# Patient Record
Sex: FEMALE | Race: BLACK OR AFRICAN AMERICAN | NOT HISPANIC OR LATINO | Employment: FULL TIME | URBAN - METROPOLITAN AREA
[De-identification: names, ages, dates, MRNs, and addresses within clinical notes are randomized per-mention and may not be internally consistent; named-entity substitution may affect disease eponyms.]

---

## 2019-07-19 ENCOUNTER — OFFICE VISIT (OUTPATIENT)
Dept: INTERNAL MEDICINE | Facility: CLINIC | Age: 29
End: 2019-07-19
Payer: COMMERCIAL

## 2019-07-19 VITALS
HEIGHT: 61 IN | WEIGHT: 150 LBS | SYSTOLIC BLOOD PRESSURE: 114 MMHG | DIASTOLIC BLOOD PRESSURE: 70 MMHG | BODY MASS INDEX: 28.32 KG/M2

## 2019-07-19 DIAGNOSIS — Z00.00 HEALTH CARE MAINTENANCE: ICD-10-CM

## 2019-07-19 DIAGNOSIS — B37.9 ANTIBIOTIC-INDUCED YEAST INFECTION: ICD-10-CM

## 2019-07-19 DIAGNOSIS — N30.00 ACUTE CYSTITIS WITHOUT HEMATURIA: Primary | ICD-10-CM

## 2019-07-19 DIAGNOSIS — T36.95XA ANTIBIOTIC-INDUCED YEAST INFECTION: ICD-10-CM

## 2019-07-19 LAB
B-HCG UR QL: NEGATIVE
BACTERIA #/AREA URNS AUTO: ABNORMAL /HPF
BILIRUB UR QL STRIP: NEGATIVE
CLARITY UR REFRACT.AUTO: ABNORMAL
COLOR UR AUTO: ABNORMAL
CTP QC/QA: YES
GLUCOSE UR QL STRIP: NEGATIVE
HGB UR QL STRIP: ABNORMAL
KETONES UR QL STRIP: NEGATIVE
LEUKOCYTE ESTERASE UR QL STRIP: ABNORMAL
MICROSCOPIC COMMENT: ABNORMAL
NITRITE UR QL STRIP: NEGATIVE
PH UR STRIP: 6 [PH] (ref 5–8)
PROT UR QL STRIP: NEGATIVE
RBC #/AREA URNS AUTO: 2 /HPF (ref 0–4)
SP GR UR STRIP: 1 (ref 1–1.03)
SQUAMOUS #/AREA URNS AUTO: 2 /HPF
URN SPEC COLLECT METH UR: ABNORMAL
WBC #/AREA URNS AUTO: 14 /HPF (ref 0–5)

## 2019-07-19 PROCEDURE — 99204 OFFICE O/P NEW MOD 45 MIN: CPT | Mod: 25,S$GLB,, | Performed by: INTERNAL MEDICINE

## 2019-07-19 PROCEDURE — 99999 PR PBB SHADOW E&M-NEW PATIENT-LVL III: ICD-10-PCS | Mod: PBBFAC,,, | Performed by: INTERNAL MEDICINE

## 2019-07-19 PROCEDURE — 99999 PR PBB SHADOW E&M-NEW PATIENT-LVL III: CPT | Mod: PBBFAC,,, | Performed by: INTERNAL MEDICINE

## 2019-07-19 PROCEDURE — 99204 PR OFFICE/OUTPT VISIT, NEW, LEVL IV, 45-59 MIN: ICD-10-PCS | Mod: 25,S$GLB,, | Performed by: INTERNAL MEDICINE

## 2019-07-19 PROCEDURE — 81001 URINALYSIS AUTO W/SCOPE: CPT

## 2019-07-19 PROCEDURE — 81025 POCT URINE PREGNANCY: ICD-10-PCS | Mod: S$GLB,,, | Performed by: INTERNAL MEDICINE

## 2019-07-19 PROCEDURE — 3008F PR BODY MASS INDEX (BMI) DOCUMENTED: ICD-10-PCS | Mod: CPTII,S$GLB,, | Performed by: INTERNAL MEDICINE

## 2019-07-19 PROCEDURE — 81025 URINE PREGNANCY TEST: CPT | Mod: S$GLB,,, | Performed by: INTERNAL MEDICINE

## 2019-07-19 PROCEDURE — 87086 URINE CULTURE/COLONY COUNT: CPT

## 2019-07-19 PROCEDURE — 3008F BODY MASS INDEX DOCD: CPT | Mod: CPTII,S$GLB,, | Performed by: INTERNAL MEDICINE

## 2019-07-19 RX ORDER — NITROFURANTOIN 25; 75 MG/1; MG/1
100 CAPSULE ORAL 2 TIMES DAILY
Qty: 10 CAPSULE | Refills: 0 | Status: SHIPPED | OUTPATIENT
Start: 2019-07-19 | End: 2019-07-24

## 2019-07-19 RX ORDER — FLUCONAZOLE 150 MG/1
150 TABLET ORAL DAILY
Qty: 1 TABLET | Refills: 0 | Status: SHIPPED | OUTPATIENT
Start: 2019-07-19 | End: 2019-07-20

## 2019-07-19 NOTE — PROGRESS NOTES
"Subjective:       Patient ID: German Hancock is a 29 y.o. female.    Chief Complaint: Urinary Tract Infection (started on Mon , sx like nausea/lower back pain, pelvic pain, urgency, urinary frequency, burning sensations while urinating. )    HPI   30 yo F here for urgent bettye of low back pain, nausea, painful urination, frequency.   No fever.  No chronic medical issues and no chronic complaints.     Review of Systems   Constitutional: Negative for fever.   Respiratory: Negative for shortness of breath.    Cardiovascular: Negative for chest pain.   Musculoskeletal: Negative.    Skin: Negative.        Objective:   /70 (BP Location: Right arm, Patient Position: Sitting, BP Method: Medium (Manual))   Ht 5' 1" (1.549 m)   Wt 68 kg (150 lb)   BMI 28.34 kg/m²      Physical Exam   Constitutional: She is oriented to person, place, and time. She appears well-developed and well-nourished. No distress.   HENT:   Head: Normocephalic and atraumatic.   Cardiovascular: Normal rate and regular rhythm.   Pulmonary/Chest: Effort normal. No respiratory distress. She has no wheezes. She has no rales.   Neurological: She is alert and oriented to person, place, and time.   Skin: Skin is warm and dry. She is not diaphoretic.   Psychiatric: She has a normal mood and affect. Her behavior is normal.       Assessment:       1. Acute cystitis without hematuria    2. Health care maintenance    3. Antibiotic-induced yeast infection        Plan:       German was seen today for urinary tract infection.    Diagnoses and all orders for this visit:    Acute cystitis without hematuria  -     POCT Urinalysis  -     Urinalysis  -     Urine culture  -     POCT Urine Pregnancy - negative  -     nitrofurantoin, macrocrystal-monohydrate, (MACROBID) 100 MG capsule; Take 1 capsule (100 mg total) by mouth 2 (two) times daily. for 5 days    Health care maintenance  -     CBC auto differential; Future  -     Hemoglobin A1c; Future  -     Comprehensive metabolic " panel; Future  -     Lipid panel; Future  -     TSH; Future  -     Vitamin D; Future    Antibiotic-induced yeast infection  -     fluconazole (DIFLUCAN) 150 MG Tab; Take 1 tablet (150 mg total) by mouth once daily. for 1 day

## 2019-07-21 LAB — BACTERIA UR CULT: NORMAL

## 2019-07-25 ENCOUNTER — LAB VISIT (OUTPATIENT)
Dept: LAB | Facility: HOSPITAL | Age: 29
End: 2019-07-25
Attending: INTERNAL MEDICINE
Payer: COMMERCIAL

## 2019-07-25 DIAGNOSIS — Z00.00 HEALTH CARE MAINTENANCE: ICD-10-CM

## 2019-07-25 LAB
25(OH)D3+25(OH)D2 SERPL-MCNC: 19 NG/ML (ref 30–96)
ALBUMIN SERPL BCP-MCNC: 3.8 G/DL (ref 3.5–5.2)
ALP SERPL-CCNC: 80 U/L (ref 55–135)
ALT SERPL W/O P-5'-P-CCNC: 11 U/L (ref 10–44)
ANION GAP SERPL CALC-SCNC: 10 MMOL/L (ref 8–16)
AST SERPL-CCNC: 11 U/L (ref 10–40)
BASOPHILS # BLD AUTO: 0.03 K/UL (ref 0–0.2)
BASOPHILS NFR BLD: 0.6 % (ref 0–1.9)
BILIRUB SERPL-MCNC: 0.3 MG/DL (ref 0.1–1)
BUN SERPL-MCNC: 11 MG/DL (ref 6–20)
CALCIUM SERPL-MCNC: 8.8 MG/DL (ref 8.7–10.5)
CHLORIDE SERPL-SCNC: 108 MMOL/L (ref 95–110)
CHOLEST SERPL-MCNC: 129 MG/DL (ref 120–199)
CHOLEST/HDLC SERPL: 2.5 {RATIO} (ref 2–5)
CO2 SERPL-SCNC: 20 MMOL/L (ref 23–29)
CREAT SERPL-MCNC: 0.8 MG/DL (ref 0.5–1.4)
DIFFERENTIAL METHOD: ABNORMAL
EOSINOPHIL # BLD AUTO: 0.1 K/UL (ref 0–0.5)
EOSINOPHIL NFR BLD: 2.6 % (ref 0–8)
ERYTHROCYTE [DISTWIDTH] IN BLOOD BY AUTOMATED COUNT: 14 % (ref 11.5–14.5)
EST. GFR  (AFRICAN AMERICAN): >60 ML/MIN/1.73 M^2
EST. GFR  (NON AFRICAN AMERICAN): >60 ML/MIN/1.73 M^2
ESTIMATED AVG GLUCOSE: 111 MG/DL (ref 68–131)
GLUCOSE SERPL-MCNC: 95 MG/DL (ref 70–110)
HBA1C MFR BLD HPLC: 5.5 % (ref 4–5.6)
HCT VFR BLD AUTO: 36.9 % (ref 37–48.5)
HDLC SERPL-MCNC: 52 MG/DL (ref 40–75)
HDLC SERPL: 40.3 % (ref 20–50)
HGB BLD-MCNC: 12.1 G/DL (ref 12–16)
LDLC SERPL CALC-MCNC: 69.8 MG/DL (ref 63–159)
LYMPHOCYTES # BLD AUTO: 2 K/UL (ref 1–4.8)
LYMPHOCYTES NFR BLD: 44 % (ref 18–48)
MCH RBC QN AUTO: 30.5 PG (ref 27–31)
MCHC RBC AUTO-ENTMCNC: 32.8 G/DL (ref 32–36)
MCV RBC AUTO: 93 FL (ref 82–98)
MONOCYTES # BLD AUTO: 0.5 K/UL (ref 0.3–1)
MONOCYTES NFR BLD: 10.8 % (ref 4–15)
NEUTROPHILS # BLD AUTO: 1.9 K/UL (ref 1.8–7.7)
NEUTROPHILS NFR BLD: 42 % (ref 38–73)
NONHDLC SERPL-MCNC: 77 MG/DL
PLATELET # BLD AUTO: 234 K/UL (ref 150–350)
PMV BLD AUTO: 10.4 FL (ref 9.2–12.9)
POTASSIUM SERPL-SCNC: 4.1 MMOL/L (ref 3.5–5.1)
PROT SERPL-MCNC: 7.7 G/DL (ref 6–8.4)
RBC # BLD AUTO: 3.97 M/UL (ref 4–5.4)
SODIUM SERPL-SCNC: 138 MMOL/L (ref 136–145)
TRIGL SERPL-MCNC: 36 MG/DL (ref 30–150)
TSH SERPL DL<=0.005 MIU/L-ACNC: 1.05 UIU/ML (ref 0.4–4)
WBC # BLD AUTO: 4.64 K/UL (ref 3.9–12.7)

## 2019-07-25 PROCEDURE — 83036 HEMOGLOBIN GLYCOSYLATED A1C: CPT

## 2019-07-25 PROCEDURE — 80053 COMPREHEN METABOLIC PANEL: CPT

## 2019-07-25 PROCEDURE — 82306 VITAMIN D 25 HYDROXY: CPT

## 2019-07-25 PROCEDURE — 36415 COLL VENOUS BLD VENIPUNCTURE: CPT

## 2019-07-25 PROCEDURE — 84443 ASSAY THYROID STIM HORMONE: CPT

## 2019-07-25 PROCEDURE — 80061 LIPID PANEL: CPT

## 2019-07-25 PROCEDURE — 85025 COMPLETE CBC W/AUTO DIFF WBC: CPT

## 2019-10-17 ENCOUNTER — TELEPHONE (OUTPATIENT)
Dept: INTERNAL MEDICINE | Facility: CLINIC | Age: 29
End: 2019-10-17

## 2019-10-17 ENCOUNTER — NURSE TRIAGE (OUTPATIENT)
Dept: ADMINISTRATIVE | Facility: CLINIC | Age: 29
End: 2019-10-17

## 2019-10-17 NOTE — TELEPHONE ENCOUNTER
Ok to schedule next week in daily change slot. If feels she needs to be seen sooner needs to see another provider.

## 2019-10-17 NOTE — TELEPHONE ENCOUNTER
Reason for Disposition   Nursing judgment    Protocols used: ST NO PROTOCOL CALL: SICK ADULT-A-[place]    German called today for appt with Fatuma Buenrostro MD, pcp.  Her call was transferred to triage line due to her report of SOB with any exertion, and palpitations.  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  She said she wants only appt in clinic, and only with Dr Buenrostro.  Of note, she states she has a strong family history of heart disease, both mother and brother with same.  Explained there is not an appt with Dr Buenrostro available today, and offered her immediate appt in clinic with NP in clinic, or Dr New in one hour.  She declined.  Assured her will message Dr Buenrostro with her request to be called with her soonest available appt to be seen, by her only.  Please contact German directly with any additional care advice. She is [employer] employee and work number she gave me is [phone] and it is ok to call her back at this number..

## 2019-10-17 NOTE — TELEPHONE ENCOUNTER
Spoke to patient. Patient is scheduled for same day appointment with PCP on Tues 10/22 at 1130 am.

## 2019-10-22 ENCOUNTER — OFFICE VISIT (OUTPATIENT)
Dept: INTERNAL MEDICINE | Facility: CLINIC | Age: 29
End: 2019-10-22
Payer: COMMERCIAL

## 2019-10-22 VITALS
SYSTOLIC BLOOD PRESSURE: 118 MMHG | WEIGHT: 141 LBS | DIASTOLIC BLOOD PRESSURE: 72 MMHG | BODY MASS INDEX: 26.62 KG/M2 | HEIGHT: 61 IN

## 2019-10-22 DIAGNOSIS — Z00.00 HEALTH CARE MAINTENANCE: ICD-10-CM

## 2019-10-22 DIAGNOSIS — F41.9 ANXIETY AND DEPRESSION: ICD-10-CM

## 2019-10-22 DIAGNOSIS — R00.2 PALPITATIONS: Primary | ICD-10-CM

## 2019-10-22 DIAGNOSIS — F41.0 PANIC ATTACKS: ICD-10-CM

## 2019-10-22 DIAGNOSIS — G47.00 INSOMNIA, UNSPECIFIED TYPE: ICD-10-CM

## 2019-10-22 DIAGNOSIS — E55.9 VITAMIN D INSUFFICIENCY: ICD-10-CM

## 2019-10-22 DIAGNOSIS — F32.A ANXIETY AND DEPRESSION: ICD-10-CM

## 2019-10-22 PROCEDURE — 99999 PR PBB SHADOW E&M-EST. PATIENT-LVL III: CPT | Mod: PBBFAC,,, | Performed by: INTERNAL MEDICINE

## 2019-10-22 PROCEDURE — 3008F PR BODY MASS INDEX (BMI) DOCUMENTED: ICD-10-PCS | Mod: CPTII,S$GLB,, | Performed by: INTERNAL MEDICINE

## 2019-10-22 PROCEDURE — 93010 EKG 12-LEAD: ICD-10-PCS | Mod: S$GLB,,, | Performed by: INTERNAL MEDICINE

## 2019-10-22 PROCEDURE — 99214 OFFICE O/P EST MOD 30 MIN: CPT | Mod: S$GLB,,, | Performed by: INTERNAL MEDICINE

## 2019-10-22 PROCEDURE — 3008F BODY MASS INDEX DOCD: CPT | Mod: CPTII,S$GLB,, | Performed by: INTERNAL MEDICINE

## 2019-10-22 PROCEDURE — 93005 EKG 12-LEAD: ICD-10-PCS | Mod: S$GLB,,, | Performed by: INTERNAL MEDICINE

## 2019-10-22 PROCEDURE — 93005 ELECTROCARDIOGRAM TRACING: CPT | Mod: S$GLB,,, | Performed by: INTERNAL MEDICINE

## 2019-10-22 PROCEDURE — 99999 PR PBB SHADOW E&M-EST. PATIENT-LVL III: ICD-10-PCS | Mod: PBBFAC,,, | Performed by: INTERNAL MEDICINE

## 2019-10-22 PROCEDURE — 93010 ELECTROCARDIOGRAM REPORT: CPT | Mod: S$GLB,,, | Performed by: INTERNAL MEDICINE

## 2019-10-22 PROCEDURE — 99214 PR OFFICE/OUTPT VISIT, EST, LEVL IV, 30-39 MIN: ICD-10-PCS | Mod: S$GLB,,, | Performed by: INTERNAL MEDICINE

## 2019-10-22 RX ORDER — TRAZODONE HYDROCHLORIDE 50 MG/1
50 TABLET ORAL NIGHTLY PRN
Qty: 30 TABLET | Refills: 3 | Status: SHIPPED | OUTPATIENT
Start: 2019-10-22 | End: 2019-12-04

## 2019-10-22 RX ORDER — VIT C/E/ZN/COPPR/LUTEIN/ZEAXAN 250MG-90MG
1000 CAPSULE ORAL DAILY
Refills: 0 | COMMUNITY
Start: 2019-10-22

## 2019-10-22 RX ORDER — ESCITALOPRAM OXALATE 10 MG/1
10 TABLET ORAL DAILY
Qty: 90 TABLET | Refills: 3 | Status: SHIPPED | OUTPATIENT
Start: 2019-10-22 | End: 2020-03-13 | Stop reason: SDUPTHER

## 2019-10-22 RX ORDER — PROPRANOLOL HYDROCHLORIDE 10 MG/1
10 TABLET ORAL 3 TIMES DAILY PRN
Qty: 30 TABLET | Refills: 1 | Status: SHIPPED | OUTPATIENT
Start: 2019-10-22 | End: 2019-12-04

## 2019-10-22 NOTE — PROGRESS NOTES
"Subjective:       Patient ID: German Hancock is a 29 y.o. female.    Chief Complaint: Palpitations (reports of recurrent heart palpitations along with SOB (heaviness in chest). persistent for a few weeks, progressively nagging.)    HPI   30 yo F here for urgent eval of palpitations and shortness of breath present for few weeks.   No chronic medical issues nor medications.   Labs in July nl except low vit d.     Previously diagnosed anxiety/depression. Was on lexapro prior to son born in 2018.   Review of Systems   Constitutional: Positive for activity change and unexpected weight change.   HENT: Positive for rhinorrhea. Negative for hearing loss and trouble swallowing.    Eyes: Negative for discharge and visual disturbance.   Respiratory: Negative for chest tightness and wheezing.    Cardiovascular: Positive for chest pain and palpitations.   Gastrointestinal: Negative for blood in stool, constipation, diarrhea and vomiting.   Endocrine: Negative for polydipsia and polyuria.   Genitourinary: Negative for difficulty urinating, dysuria, hematuria and menstrual problem.   Musculoskeletal: Positive for neck pain. Negative for arthralgias and joint swelling.   Neurological: Positive for headaches. Negative for weakness.   Psychiatric/Behavioral: Positive for dysphoric mood. Negative for confusion.       Objective:   /72 (BP Location: Right arm, Patient Position: Sitting, BP Method: Medium (Manual))   Ht 5' 1" (1.549 m)   Wt 64 kg (141 lb)   BMI 26.64 kg/m²      Physical Exam   Constitutional: She is oriented to person, place, and time. She appears well-developed and well-nourished. No distress.   HENT:   Head: Normocephalic and atraumatic.   Cardiovascular: Normal rate and regular rhythm.   Pulmonary/Chest: Effort normal. No respiratory distress. She has no wheezes. She has no rales.   Neurological: She is alert and oriented to person, place, and time.   Skin: Skin is warm and dry. She is not diaphoretic. "   Psychiatric: She has a normal mood and affect. Her behavior is normal.       Assessment:       1. Palpitations    2. Vitamin D insufficiency    3. Anxiety and depression    4. Insomnia, unspecified type    5. Health care maintenance    6. Panic attacks        Plan:       German was seen today for palpitations.    Diagnoses and all orders for this visit:    Palpitations due to Anxiety and depression  -     Start escitalopram oxalate (LEXAPRO) 10 MG tablet; Take 1 tablet (10 mg total) by mouth once daily.  -     EKG 12-lead  -     propranolol (INDERAL) 10 MG tablet; Take 1 tablet (10 mg total) by mouth 3 (three) times daily as needed (anxiety).    Vitamin D insufficiency  -     cholecalciferol, vitamin D3, (VITAMIN D3) 1,000 unit capsule; Take 1 capsule (1,000 Units total) by mouth once daily.    Insomnia, unspecified type  -     traZODone (DESYREL) 50 MG tablet; Take 1 tablet (50 mg total) by mouth nightly as needed for Insomnia.    Health care maintenance  -     Ambulatory referral to Obstetrics / Gynecology    Panic attacks  -     propranolol (INDERAL) 10 MG tablet; Take 1 tablet (10 mg total) by mouth 3 (three) times daily as needed (anxiety).

## 2019-10-23 ENCOUNTER — TELEPHONE (OUTPATIENT)
Dept: INTERNAL MEDICINE | Facility: CLINIC | Age: 29
End: 2019-10-23

## 2019-10-23 NOTE — TELEPHONE ENCOUNTER
I have looked too and I don't see anything open in 6 weeks for a Tuesday afternoon. I'm also not able to override the schedule. Let me see if someone else can override.    Tessie.

## 2019-10-23 NOTE — TELEPHONE ENCOUNTER
----- Message from Kaitlynn Mcallister sent at 10/23/2019 10:58 AM CDT -----  Dr cueva wants to see pt back in 6 week nothing coming up to me.  Would like a Tuesday afternoon

## 2019-11-20 ENCOUNTER — PATIENT OUTREACH (OUTPATIENT)
Dept: ADMINISTRATIVE | Facility: HOSPITAL | Age: 29
End: 2019-11-20

## 2019-12-04 ENCOUNTER — OFFICE VISIT (OUTPATIENT)
Dept: INTERNAL MEDICINE | Facility: CLINIC | Age: 29
End: 2019-12-04
Payer: COMMERCIAL

## 2019-12-04 VITALS
HEIGHT: 61 IN | BODY MASS INDEX: 26.62 KG/M2 | WEIGHT: 141 LBS | SYSTOLIC BLOOD PRESSURE: 116 MMHG | DIASTOLIC BLOOD PRESSURE: 74 MMHG

## 2019-12-04 DIAGNOSIS — F41.9 ANXIETY AND DEPRESSION: Primary | ICD-10-CM

## 2019-12-04 DIAGNOSIS — N30.90 CYSTITIS: ICD-10-CM

## 2019-12-04 DIAGNOSIS — R39.15 URINARY URGENCY: ICD-10-CM

## 2019-12-04 DIAGNOSIS — F32.A ANXIETY AND DEPRESSION: Primary | ICD-10-CM

## 2019-12-04 DIAGNOSIS — G47.00 INSOMNIA, UNSPECIFIED TYPE: ICD-10-CM

## 2019-12-04 LAB
B-HCG UR QL: NEGATIVE
BACTERIA #/AREA URNS AUTO: ABNORMAL /HPF
BILIRUB SERPL-MCNC: NEGATIVE MG/DL
BILIRUB UR QL STRIP: NEGATIVE
BLOOD, POC UA: ABNORMAL
CLARITY UR REFRACT.AUTO: ABNORMAL
COLOR UR AUTO: YELLOW
CTP QC/QA: YES
GLUCOSE UR QL STRIP: NEGATIVE
GLUCOSE UR QL STRIP: NORMAL
HGB UR QL STRIP: ABNORMAL
KETONES UR QL STRIP: NEGATIVE
KETONES UR QL STRIP: NEGATIVE
LEUKOCYTE ESTERASE UR QL STRIP: ABNORMAL
LEUKOCYTE ESTERASE URINE, POC: ABNORMAL
MICROSCOPIC COMMENT: ABNORMAL
NITRITE UR QL STRIP: POSITIVE
NITRITE, POC UA: POSITIVE
PH UR STRIP: 5 [PH] (ref 5–8)
PH, POC UA: 5
PROT UR QL STRIP: NEGATIVE
PROTEIN, POC: ABNORMAL
RBC #/AREA URNS AUTO: 13 /HPF (ref 0–4)
SP GR UR STRIP: 1.03 (ref 1–1.03)
SPECIFIC GRAVITY, POC UA: 1.02
URN SPEC COLLECT METH UR: ABNORMAL
UROBILINOGEN, POC UA: NORMAL
WBC #/AREA URNS AUTO: 46 /HPF (ref 0–5)

## 2019-12-04 PROCEDURE — 99999 PR PBB SHADOW E&M-EST. PATIENT-LVL III: CPT | Mod: PBBFAC,,, | Performed by: INTERNAL MEDICINE

## 2019-12-04 PROCEDURE — 3008F BODY MASS INDEX DOCD: CPT | Mod: CPTII,S$GLB,, | Performed by: INTERNAL MEDICINE

## 2019-12-04 PROCEDURE — 81001 URINALYSIS AUTO W/SCOPE: CPT

## 2019-12-04 PROCEDURE — 81025 URINE PREGNANCY TEST: CPT | Mod: S$GLB,,, | Performed by: INTERNAL MEDICINE

## 2019-12-04 PROCEDURE — 87088 URINE BACTERIA CULTURE: CPT

## 2019-12-04 PROCEDURE — 87077 CULTURE AEROBIC IDENTIFY: CPT

## 2019-12-04 PROCEDURE — 99214 PR OFFICE/OUTPT VISIT, EST, LEVL IV, 30-39 MIN: ICD-10-PCS | Mod: 25,S$GLB,, | Performed by: INTERNAL MEDICINE

## 2019-12-04 PROCEDURE — 99214 OFFICE O/P EST MOD 30 MIN: CPT | Mod: 25,S$GLB,, | Performed by: INTERNAL MEDICINE

## 2019-12-04 PROCEDURE — 81025 POCT URINE PREGNANCY: ICD-10-PCS | Mod: S$GLB,,, | Performed by: INTERNAL MEDICINE

## 2019-12-04 PROCEDURE — 87186 SC STD MICRODIL/AGAR DIL: CPT

## 2019-12-04 PROCEDURE — 81003 URINALYSIS AUTO W/O SCOPE: CPT | Mod: QW,S$GLB,, | Performed by: INTERNAL MEDICINE

## 2019-12-04 PROCEDURE — 99999 PR PBB SHADOW E&M-EST. PATIENT-LVL III: ICD-10-PCS | Mod: PBBFAC,,, | Performed by: INTERNAL MEDICINE

## 2019-12-04 PROCEDURE — 3008F PR BODY MASS INDEX (BMI) DOCUMENTED: ICD-10-PCS | Mod: CPTII,S$GLB,, | Performed by: INTERNAL MEDICINE

## 2019-12-04 PROCEDURE — 87086 URINE CULTURE/COLONY COUNT: CPT

## 2019-12-04 PROCEDURE — 81003 POCT URINALYSIS: ICD-10-PCS | Mod: QW,S$GLB,, | Performed by: INTERNAL MEDICINE

## 2019-12-04 RX ORDER — NITROFURANTOIN 25; 75 MG/1; MG/1
100 CAPSULE ORAL 2 TIMES DAILY
Qty: 10 CAPSULE | Refills: 0 | Status: SHIPPED | OUTPATIENT
Start: 2019-12-04 | End: 2019-12-09

## 2019-12-04 RX ORDER — TRAZODONE HYDROCHLORIDE 50 MG/1
25 TABLET ORAL NIGHTLY PRN
Qty: 30 TABLET | Refills: 3 | COMMUNITY
Start: 2019-12-04

## 2019-12-04 RX ORDER — FLUCONAZOLE 150 MG/1
150 TABLET ORAL DAILY
Qty: 1 TABLET | Refills: 0 | Status: SHIPPED | OUTPATIENT
Start: 2019-12-04 | End: 2019-12-05

## 2019-12-04 NOTE — PROGRESS NOTES
"Subjective:       Patient ID: German Hancock is a 29 y.o. female.    Chief Complaint: Follow-up (6 week follow up.); Urinary Tract Infection (reports of urgency/pressure after urination. started about a week ago. ); and Medication Problem (patient has been off of the prapanolol medication for about 2 weeks now, after taking it, makes her feel dizzy. no other unusual sx.)    HPI   28 yo F here for follow up of anxiety and depression w palpitations. At last visit started lexapro and propranolol prn.   lexapro is working well. She stopped the prn propranolol due to dizziness.  Takes 1/2 trazodone due to tiredness next day.  She is overdue for pap and scheduled with gyn tomorrow.     Leuk trace  Nitrate positive    LMP last week but was light.     Review of Systems   Constitutional: Negative for fever.   Respiratory: Negative for shortness of breath.    Cardiovascular: Negative for chest pain.   Musculoskeletal: Negative.    Skin: Negative.        Objective:   /74 (BP Location: Right arm, Patient Position: Sitting, BP Method: Medium (Manual))   Ht 5' 1" (1.549 m)   Wt 64 kg (141 lb)   BMI 26.64 kg/m²      Physical Exam   Constitutional: She is oriented to person, place, and time. She appears well-developed and well-nourished. No distress.   HENT:   Head: Normocephalic and atraumatic.   Cardiovascular: Normal rate.   Pulmonary/Chest: Effort normal. No respiratory distress.   Neurological: She is alert and oriented to person, place, and time.   Skin: Skin is warm and dry. She is not diaphoretic.   Psychiatric: She has a normal mood and affect. Her behavior is normal.       Assessment:       1. Anxiety and depression    2. Insomnia, unspecified type    3. Urinary urgency    4. Cystitis        Plan:       German was seen today for follow-up, urinary tract infection and medication problem.    Diagnoses and all orders for this visit:    Anxiety and depression  Continue lexapro    Insomnia, unspecified type  Trazodone 25mg " prn, okay to take second dose early in the night if needed    Urinary urgency  -     POCT Urinalysis  -     Urinalysis  -     Urine culture    Cystitis  -     fluconazole (DIFLUCAN) 150 MG Tab; Take 1 tablet (150 mg total) by mouth once daily. for 1 day  -     nitrofurantoin, macrocrystal-monohydrate, (MACROBID) 100 MG capsule; Take 1 capsule (100 mg total) by mouth 2 (two) times daily. for 5 days  -     POCT Urine Pregnancy

## 2019-12-05 ENCOUNTER — TELEPHONE (OUTPATIENT)
Dept: OBSTETRICS AND GYNECOLOGY | Facility: CLINIC | Age: 29
End: 2019-12-05

## 2019-12-05 ENCOUNTER — OFFICE VISIT (OUTPATIENT)
Dept: OBSTETRICS AND GYNECOLOGY | Facility: CLINIC | Age: 29
End: 2019-12-05
Payer: COMMERCIAL

## 2019-12-05 VITALS
WEIGHT: 141.75 LBS | HEIGHT: 61 IN | SYSTOLIC BLOOD PRESSURE: 117 MMHG | BODY MASS INDEX: 26.76 KG/M2 | DIASTOLIC BLOOD PRESSURE: 79 MMHG

## 2019-12-05 DIAGNOSIS — Z11.3 SCREENING FOR STDS (SEXUALLY TRANSMITTED DISEASES): ICD-10-CM

## 2019-12-05 DIAGNOSIS — Z01.419 WELL WOMAN EXAM WITH ROUTINE GYNECOLOGICAL EXAM: Primary | ICD-10-CM

## 2019-12-05 DIAGNOSIS — N89.8 VAGINAL DISCHARGE: ICD-10-CM

## 2019-12-05 LAB
C TRACH DNA SPEC QL NAA+PROBE: NOT DETECTED
N GONORRHOEA DNA SPEC QL NAA+PROBE: NOT DETECTED

## 2019-12-05 PROCEDURE — 99385 PR PREVENTIVE VISIT,NEW,18-39: ICD-10-PCS | Mod: S$GLB,,, | Performed by: NURSE PRACTITIONER

## 2019-12-05 PROCEDURE — 87481 CANDIDA DNA AMP PROBE: CPT | Mod: 59

## 2019-12-05 PROCEDURE — 99999 PR PBB SHADOW E&M-EST. PATIENT-LVL III: CPT | Mod: PBBFAC,,, | Performed by: NURSE PRACTITIONER

## 2019-12-05 PROCEDURE — 87491 CHLMYD TRACH DNA AMP PROBE: CPT

## 2019-12-05 PROCEDURE — 87801 DETECT AGNT MULT DNA AMPLI: CPT

## 2019-12-05 PROCEDURE — 99999 PR PBB SHADOW E&M-EST. PATIENT-LVL III: ICD-10-PCS | Mod: PBBFAC,,, | Performed by: NURSE PRACTITIONER

## 2019-12-05 PROCEDURE — 99385 PREV VISIT NEW AGE 18-39: CPT | Mod: S$GLB,,, | Performed by: NURSE PRACTITIONER

## 2019-12-05 PROCEDURE — 88142 CYTOPATH C/V THIN LAYER: CPT

## 2019-12-05 PROCEDURE — 87624 HPV HI-RISK TYP POOLED RSLT: CPT

## 2019-12-05 PROCEDURE — 87661 TRICHOMONAS VAGINALIS AMPLIF: CPT

## 2019-12-05 NOTE — PROGRESS NOTES
HISTORY OF PRESENT ILLNESS:    German Hancock is a 29 y.o. female, ., Patient's last menstrual period was 2019 (exact date).,  presents for a routine exam and c/o vaginal infection.  -Was treated with Diflucan and Macrobid for a UTI.   -Did not take the Diflucan and is itching slightly now.  -The UTI symptoms resolved.  -Denies associated fever, pelvic or vaginal pain, AUB or use of vulvovaginal irritants.  -Requests STD testing as she and her  are .    Past Medical History:   Diagnosis Date    Anxiety     Depression     Vitamin D deficiency        PAST SURGICAL HISTORY:  History reviewed. No pertinent surgical history.    MEDICATIONS AND ALLERGIES:      Current Outpatient Medications:     cholecalciferol, vitamin D3, (VITAMIN D3) 1,000 unit capsule, Take 1 capsule (1,000 Units total) by mouth once daily., Disp: , Rfl: 0    escitalopram oxalate (LEXAPRO) 10 MG tablet, Take 1 tablet (10 mg total) by mouth once daily., Disp: 90 tablet, Rfl: 3    nitrofurantoin, macrocrystal-monohydrate, (MACROBID) 100 MG capsule, Take 1 capsule (100 mg total) by mouth 2 (two) times daily. for 5 days, Disp: 10 capsule, Rfl: 0    traZODone (DESYREL) 50 MG tablet, Take 0.5 tablets (25 mg total) by mouth nightly as needed for Insomnia., Disp: 30 tablet, Rfl: 3    Review of patient's allergies indicates:   Allergen Reactions    Penicillins      Rash.       Family History   Problem Relation Age of Onset    Colon cancer Neg Hx     Ovarian cancer Neg Hx     Breast cancer Neg Hx        Social History     Socioeconomic History    Marital status:      Spouse name: Not on file    Number of children: Not on file    Years of education: Not on file    Highest education level: Not on file   Occupational History    Not on file   Social Needs    Financial resource strain: Not hard at all    Food insecurity:     Worry: Never true     Inability: Never true    Transportation needs:     Medical: No      "Non-medical: No   Tobacco Use    Smoking status: Never Smoker    Smokeless tobacco: Never Used   Substance and Sexual Activity    Alcohol use: Yes     Frequency: Monthly or less     Drinks per session: 1 or 2     Binge frequency: Never    Drug use: Never    Sexual activity: Not Currently   Lifestyle    Physical activity:     Days per week: 3 days     Minutes per session: 30 min    Stress: Very much   Relationships    Social connections:     Talks on phone: Never     Gets together: Never     Attends [employer] service: Not on file     Active member of club or organization: No     Attends meetings of clubs or organizations: Never     Relationship status:    Other Topics Concern    Not on file   Social History Narrative    Not on file       OB HISTORY: Number of vaginal deliveries:2    COMPREHENSIVE GYN HISTORY:  PAP History: Denies abnormal Paps. UNKNOWN DATE OF LAST PAP "NEG"  Infection History: Denies STDs. Denies PID.  Benign History: Denies uterine fibroids. Denies ovarian cysts. Denies endometriosis. Denies other conditions.  Cancer History: Denies cervical cancer. Denies uterine cancer or hyperplasia. Denies ovarian cancer. Denies vulvar cancer or pre-cancer. Denies vaginal cancer or pre-cancer. Denies breast cancer. Denies colon cancer.  Sexual Activity History: Reports currently being sexually active  Menstrual History: Monthly. Mod then light flow.   Dysmenorrhea History: Reports mild dysmenorrhea.   Contraception: Condoms.    ROS:  GENERAL: No weight changes. No swelling. No fatigue. No fever.  CARDIOVASCULAR: No chest pain. No shortness of breath. No leg cramps.   NEUROLOGICAL: No headaches. No vision changes.  BREASTS: No pain. No lumps. No discharge.  ABDOMEN: No pain. No nausea. No vomiting. No diarrhea. No constipation.  REPRODUCTIVE: No abnormal bleeding.   VULVA: No pain. No lesions. + ITCHING.   VAGINA: No relaxation. No itching. No odor. No discharge. No lesions.  URINARY: No " "incontinence. No nocturia. No frequency. No dysuria.    /79   Ht 5' 1" (1.549 m)   Wt 64.3 kg (141 lb 12.1 oz)   LMP 11/23/2019 (Exact Date)   BMI 26.78 kg/m²     PE:  APPEARANCE: Well nourished, well developed, in no acute distress.  AFFECT: WNL, alert and oriented x 3.  SKIN: No acne or hirsutism.  NECK: Neck symmetric, without masses or thyromegaly.  NODES: No inguinal, cervical, axillary or femoral lymph node enlargement.  CHEST: Good respiratory effort.   ABDOMEN: Soft. No tenderness or masses.   BREASTS: Symmetrical, no skin changes, visible lesions, palpable masses or nipple discharge bilaterally.  PELVIC: External female genitalia without lesions.  Female hair distribution. Adequate perineal body, Normal urethral meatus. Vagina moist and well rugated without lesions. MUCOID ODORLESS D/C present.  No significant cystocele or rectocele present. Cervix pink without lesions, discharge or tenderness. Uterus is 8 week size, regular, mobile and nontender. Adnexa without masses or tenderness.  EXTREMITIES: No edema    DIAGNOSIS:  1. Well woman exam with routine gynecological exam    2. Screening for STDs (sexually transmitted diseases)    3. Vaginal discharge        PLAN:    Orders Placed This Encounter    HPV High Risk Genotypes, PCR    C. trachomatis/N. gonorrhoeae by AMP DNA    Vaginosis Screen by DNA Probe    HIV 1/2 Ag/Ab (4th Gen)    RPR    Hepatitis panel, acute    Liquid-Based Pap Smear, Screening       COUNSELING:  The patient was counseled today on:  -all contraceptive options, which she declined;  -Vaginitis prevention including :  a. avoiding feminine products such as deoderant soaps, body wash, bubble bath, douches, scented toilet paper, deoderant tampons or pads, baby or feminine wipes, chronic pad use, etc. and       b. avoiding other vulvovaginal irritants such as long hot baths, humidity, tight, synthetic clothing, chlorine and sitting around in wet bathing suits and   c. wearing " cotton underwear, avoiding thong underwear and no underwear to bed and      d. taking showers instead of baths and use a hair dryer on cool setting afterwards to dry and  e.wearing cotton to exercise and shower immediately after exercise and change clothes and  f. using polyurethane condoms without spermicide if sexually active and symptoms are triggered by intercourse;  -STDs and prevention;  -to take the Diflucan already prescribed;  -pelvic rest until symptoms resolve;  -A.C.S. Pap and pelvic exam guidelines (pap every 3 years);  -to follow up with her PCP for other health maintenance.    FOLLOW-UP with me pending test results and with Dr Rachel annually.

## 2019-12-05 NOTE — LETTER
December 5, 2019      Fatuma Buenrostro MD  [address]  [facility] [address]           [facility] 5th Floor  [address]  [facility] [address]  Phone: [phone]          Patient: German Hancock   MR Number: [id number]   YOB: 1990   Date of Visit: 12/5/2019       Dear Dr. Fatuma Buenrostro:    Thank you for referring German Hancock to me for evaluation. Attached you will find relevant portions of my assessment and plan of care.    If you have questions, please do not hesitate to call me. I look forward to following German Hancock along with you.    Sincerely,    DEREK Garner NP    Enclosure  CC:  No Recipients    If you would like to receive this communication electronically, please contact [email or web address] or [phone] to request more information on [facility] access.    For providers and/or their staff who would like to refer a patient to [facility], please contact us through our one-stop-shop provider referral line, [facility], at [phone].    If you feel you have received this communication in error or would no longer like to receive these types of communications, please e-mail [email or web address]

## 2019-12-06 LAB — BACTERIA UR CULT: ABNORMAL

## 2019-12-09 LAB
BACTERIAL VAGINOSIS DNA: NEGATIVE
CANDIDA GLABRATA DNA: NEGATIVE
CANDIDA KRUSEI DNA: NEGATIVE
CANDIDA RRNA VAG QL PROBE: POSITIVE
T VAGINALIS RRNA GENITAL QL PROBE: NEGATIVE

## 2019-12-10 LAB
HPV HR 12 DNA SPEC QL NAA+PROBE: NEGATIVE
HPV16 AG SPEC QL: NEGATIVE
HPV18 DNA SPEC QL NAA+PROBE: NEGATIVE

## 2019-12-21 LAB
FINAL PATHOLOGIC DIAGNOSIS: NORMAL
Lab: NORMAL

## 2020-01-20 DIAGNOSIS — G47.00 INSOMNIA, UNSPECIFIED TYPE: ICD-10-CM

## 2020-01-20 RX ORDER — TRAZODONE HYDROCHLORIDE 50 MG/1
50 TABLET ORAL NIGHTLY PRN
Qty: 30 TABLET | Refills: 3 | Status: SHIPPED | OUTPATIENT
Start: 2020-01-20 | End: 2020-03-25 | Stop reason: SDUPTHER

## 2020-01-25 ENCOUNTER — OFFICE VISIT (OUTPATIENT)
Dept: URGENT CARE | Facility: CLINIC | Age: 30
End: 2020-01-25
Payer: COMMERCIAL

## 2020-01-25 VITALS
HEIGHT: 61 IN | OXYGEN SATURATION: 98 % | TEMPERATURE: 98 F | SYSTOLIC BLOOD PRESSURE: 125 MMHG | WEIGHT: 141 LBS | DIASTOLIC BLOOD PRESSURE: 73 MMHG | BODY MASS INDEX: 26.62 KG/M2 | HEART RATE: 78 BPM

## 2020-01-25 DIAGNOSIS — J06.9 VIRAL URI: Primary | ICD-10-CM

## 2020-01-25 DIAGNOSIS — J02.9 SORE THROAT: ICD-10-CM

## 2020-01-25 LAB
CTP QC/QA: YES
MOLECULAR STREP A: NEGATIVE

## 2020-01-25 PROCEDURE — 87081 CULTURE SCREEN ONLY: CPT

## 2020-01-25 PROCEDURE — 87651 STREP A DNA AMP PROBE: CPT | Mod: QW,S$GLB,, | Performed by: PHYSICIAN ASSISTANT

## 2020-01-25 PROCEDURE — 87651 POCT STREP A MOLECULAR: ICD-10-PCS | Mod: QW,S$GLB,, | Performed by: PHYSICIAN ASSISTANT

## 2020-01-25 PROCEDURE — 99214 OFFICE O/P EST MOD 30 MIN: CPT | Mod: S$GLB,,, | Performed by: PHYSICIAN ASSISTANT

## 2020-01-25 PROCEDURE — 87147 CULTURE TYPE IMMUNOLOGIC: CPT

## 2020-01-25 PROCEDURE — 99214 PR OFFICE/OUTPT VISIT, EST, LEVL IV, 30-39 MIN: ICD-10-PCS | Mod: S$GLB,,, | Performed by: PHYSICIAN ASSISTANT

## 2020-01-25 RX ORDER — LORATADINE 10 MG/1
10 TABLET ORAL DAILY
Qty: 30 TABLET | Refills: 0 | Status: SHIPPED | OUTPATIENT
Start: 2020-01-25 | End: 2020-03-25

## 2020-01-25 RX ORDER — FLUTICASONE PROPIONATE 50 MCG
1 SPRAY, SUSPENSION (ML) NASAL DAILY
Qty: 15.8 ML | Refills: 0 | Status: SHIPPED | OUTPATIENT
Start: 2020-01-25 | End: 2020-02-24

## 2020-01-25 NOTE — PROGRESS NOTES
"Subjective:       Patient ID: German Hancock is a 29 y.o. female.    Vitals:  height is 5' 1" (1.549 m) and weight is 64 kg (141 lb). Her temperature is 97.6 °F (36.4 °C). Her blood pressure is 125/73 and her pulse is 78. Her oxygen saturation is 98%.     Chief Complaint: Sore Throat (sore throat, hurts to swallow)    This is a 29-year-old female who presents to the clinic complaining of sore throat for the past 3 days.  Also with some sinus pressure and congestion for the past 3 days as well.  She denies any fevers, chills, sweats.      Sore Throat    This is a new problem. The current episode started yesterday. The problem has been gradually worsening. The pain is worse on the left side. There has been no fever. The pain is at a severity of 7/10. The pain is moderate. Associated symptoms include trouble swallowing. Pertinent negatives include no congestion, coughing, diarrhea, headaches, shortness of breath or vomiting. She has had no exposure to strep or mono. She has tried acetaminophen for the symptoms. The treatment provided no relief.       Constitution: Negative for chills, fatigue and fever.   HENT: Positive for sore throat and trouble swallowing. Negative for congestion.    Neck: Negative for painful lymph nodes.   Cardiovascular: Negative for chest pain and leg swelling.   Eyes: Negative for double vision and blurred vision.   Respiratory: Negative for cough and shortness of breath.    Gastrointestinal: Negative for nausea, vomiting and diarrhea.   Genitourinary: Negative for dysuria, frequency, urgency and history of kidney stones.   Musculoskeletal: Negative for joint pain, joint swelling, muscle cramps and muscle ache.   Skin: Negative for color change, pale, rash and bruising.   Allergic/Immunologic: Negative for seasonal allergies.   Neurological: Negative for dizziness, history of vertigo, light-headedness, passing out and headaches.   Hematologic/Lymphatic: Negative for swollen lymph nodes. "   Psychiatric/Behavioral: Negative for nervous/anxious, sleep disturbance and depression. The patient is not nervous/anxious.        Objective:      Physical Exam   Constitutional: She is oriented to person, place, and time. She appears well-developed and well-nourished. She is cooperative.  Non-toxic appearance. She does not have a sickly appearance. She does not appear ill. No distress.   HENT:   Head: Normocephalic and atraumatic.   Right Ear: Hearing, external ear and ear canal normal. A middle ear effusion (clear) is present.   Left Ear: Hearing, external ear and ear canal normal. A middle ear effusion (clear) is present.   Nose: Nose normal. No mucosal edema, rhinorrhea or nasal deformity. No epistaxis. Right sinus exhibits no maxillary sinus tenderness and no frontal sinus tenderness. Left sinus exhibits no maxillary sinus tenderness and no frontal sinus tenderness.   Mouth/Throat: Uvula is midline and mucous membranes are normal. No trismus in the jaw. Normal dentition. No uvula swelling. Posterior oropharyngeal erythema present. No oropharyngeal exudate or posterior oropharyngeal edema. Tonsils are 2+ on the right. Tonsils are 2+ on the left. No tonsillar exudate.   Eyes: Conjunctivae and lids are normal. No scleral icterus.   Neck: Trachea normal, full passive range of motion without pain and phonation normal. Neck supple. No neck rigidity. No edema and no erythema present.   Cardiovascular: Normal rate, regular rhythm, normal heart sounds, intact distal pulses and normal pulses.   Pulmonary/Chest: Effort normal and breath sounds normal. No respiratory distress. She has no decreased breath sounds. She has no rhonchi.   Abdominal: Normal appearance.   Musculoskeletal: Normal range of motion. She exhibits no edema or deformity.   Neurological: She is alert and oriented to person, place, and time. She exhibits normal muscle tone. Coordination normal.   Skin: Skin is warm, dry, intact, not diaphoretic and not  pale.   Psychiatric: She has a normal mood and affect. Her speech is normal and behavior is normal. Judgment and thought content normal. Cognition and memory are normal.   Nursing note and vitals reviewed.    Results for orders placed or performed in visit on 01/25/20   POCT Strep A, Molecular   Result Value Ref Range    Molecular Strep A, POC Negative Negative     Acceptable Yes            Assessment:       1. Viral URI    2. Sore throat        Plan:         Viral URI  -     Strep A culture, throat  -     fluticasone propionate (FLONASE) 50 mcg/actuation nasal spray; 1 spray (50 mcg total) by Each Nostril route once daily.  Dispense: 15.8 mL; Refill: 0  -     loratadine (CLARITIN) 10 mg tablet; Take 1 tablet (10 mg total) by mouth once daily.  Dispense: 30 tablet; Refill: 0    Sore throat  -     POCT Strep A, Molecular         Patient Instructions   Hot tea and honey  Coldfluids, increase fluids and rest   we will call you with results    Please drink plenty of fluids.  Please get plenty of rest.  Please return here or go to the Emergency Department for any concerns or worsening of condition.  If you were given wait & see antibiotics, please wait 3-5 days before taking them, and only take them if your symptoms have worsened or not improved.  If you do begin taking the antibiotics, please take them to completion.  If you were prescribed antibiotics, please take them to completion.  If you were prescribed a narcotic medication, do not drive or operate heavy equipment or machinery while taking these medications.  If you do not have Hypertension or any history of palpitations, it is ok to take over the counter Sudafed or Mucinex D or Allegra-D or Claritin-D or Zyrtec-D.  If you do take one of the above, it is ok to combine that with plain over the counter Mucinex or Allegra or Claritin or Zyrtec.  If for example you are taking Zyrtec -D, you can combine that with Mucinex, but not Mucinex-D.  If you are  taking Mucinex-D, you can combine that with plain Allegra or Claritin or Zyrtec.   If you do have Hypertension or palpitations, it is safe to take Coricidin HBP for relief of sinus symptoms.  We recommend you take over the counter Flonase (Fluticasone) or another nasally inhaled steroid unless you are already taking one.  Nasal irrigation with a saline spray or Netti Pot like device per their directions is also recommended.  If not allergic, please take over the counter Tylenol (Acetaminophen) and/or Motrin (Ibuprofen) as directed for control of pain and/or fever.  Please follow up with your primary care doctor or specialist as needed.    If you  smoke, please stop smoking.      Viral Pharyngitis (Sore Throat)    You (or your child, if your child is the patient) have pharyngitis (sore throat). This infection is caused by a virus. It can cause throat pain that is worse when swallowing, aching all over, headache, and fever. The infection may be spread by coughing, kissing, or touching others after touching your mouth or nose. Antibiotic medications do not work against viruses, so they are not used for treating this condition.  Home care  · If your symptoms are severe, rest at home. Return to work or school when you feel well enough.   · Drink plenty of fluids to avoid dehydration.  · For children: Use acetaminophen for fever, fussiness or discomfort. In infants over six months of age, you may use ibuprofen instead of acetaminophen. (NOTE: If your child has chronic liver or kidney disease or ever had a stomach ulcer or GI bleeding, talk with your doctor before using these medicines.) (NOTE: Aspirin should never be used in anyone under 18 years of age who is ill with a fever. It may cause severe liver damage.)   · For adults: You may use acetaminophen or ibuprofen to control pain or fever, unless another medicine was prescribed for this. (NOTE: If you have chronic liver or kidney disease or ever had a stomach ulcer or  GI bleeding, talk with your doctor before using these medicines.)  · Throat lozenges or numbing throat sprays can help reduce pain. Gargling with warm salt water will also help reduce throat pain. For this, dissolve 1/2 teaspoon of salt in 1 glass of warm water. To help soothe a sore throat, children can sip on juice or a popsicle. Children 5 years and older can also suck on a lollipop or hard candy.  · Avoid salty or spicy foods, which can be irritating to the throat.  Follow-up care  Follow up with your healthcare provider or our staff if you are not improving over the next week.  When to seek medical advice  Call your healthcare provider right away if any of these occur:  · Fever as directed by your doctor.  For children, seek care if:  ¨ Your child is of any age and has repeated fevers above 104°F (40°C).  ¨ Your child is younger than 2 years of age and has a fever of 100.4°F (38°C) that continues for more than 1 day.  ¨ Your child is 2 years old or older and has a fever of 100.4°F (38°C) that continues for more than 3 days.  · New or worsening ear pain, sinus pain, or headache  · Painful lumps in the back of neck  · Stiff neck  · Lymph nodes are getting larger  · Inability to swallow liquids, excessive drooling, or inability to open mouth wide due to throat pain  · Signs of dehydration (very dark urine or no urine, sunken eyes, dizziness)  · Trouble breathing or noisy breathing  · Muffled voice  · New rash  · Child appears to be getting sicker  Date Last Reviewed: 4/13/2015  © 3478-3641 [employer]. [address] [place] [address], [place] [address]. All rights reserved. This information is not intended as a substitute for professional medical care. Always follow your healthcare professional's instructions.

## 2020-01-25 NOTE — PATIENT INSTRUCTIONS
Hot tea and honey  Coldfluids, increase fluids and rest   we will call you with results    Please drink plenty of fluids.  Please get plenty of rest.  Please return here or go to the Emergency Department for any concerns or worsening of condition.  If you were given wait & see antibiotics, please wait 3-5 days before taking them, and only take them if your symptoms have worsened or not improved.  If you do begin taking the antibiotics, please take them to completion.  If you were prescribed antibiotics, please take them to completion.  If you were prescribed a narcotic medication, do not drive or operate heavy equipment or machinery while taking these medications.  If you do not have Hypertension or any history of palpitations, it is ok to take over the counter Sudafed or Mucinex D or Allegra-D or Claritin-D or Zyrtec-D.  If you do take one of the above, it is ok to combine that with plain over the counter Mucinex or Allegra or Claritin or Zyrtec.  If for example you are taking Zyrtec -D, you can combine that with Mucinex, but not Mucinex-D.  If you are taking Mucinex-D, you can combine that with plain Allegra or Claritin or Zyrtec.   If you do have Hypertension or palpitations, it is safe to take Coricidin HBP for relief of sinus symptoms.  We recommend you take over the counter Flonase (Fluticasone) or another nasally inhaled steroid unless you are already taking one.  Nasal irrigation with a saline spray or Netti Pot like device per their directions is also recommended.  If not allergic, please take over the counter Tylenol (Acetaminophen) and/or Motrin (Ibuprofen) as directed for control of pain and/or fever.  Please follow up with your primary care doctor or specialist as needed.    If you  smoke, please stop smoking.      Viral Pharyngitis (Sore Throat)    You (or your child, if your child is the patient) have pharyngitis (sore throat). This infection is caused by a virus. It can cause throat pain that is  worse when swallowing, aching all over, headache, and fever. The infection may be spread by coughing, kissing, or touching others after touching your mouth or nose. Antibiotic medications do not work against viruses, so they are not used for treating this condition.  Home care  · If your symptoms are severe, rest at home. Return to work or school when you feel well enough.   · Drink plenty of fluids to avoid dehydration.  · For children: Use acetaminophen for fever, fussiness or discomfort. In infants over six months of age, you may use ibuprofen instead of acetaminophen. (NOTE: If your child has chronic liver or kidney disease or ever had a stomach ulcer or GI bleeding, talk with your doctor before using these medicines.) (NOTE: Aspirin should never be used in anyone under 18 years of age who is ill with a fever. It may cause severe liver damage.)   · For adults: You may use acetaminophen or ibuprofen to control pain or fever, unless another medicine was prescribed for this. (NOTE: If you have chronic liver or kidney disease or ever had a stomach ulcer or GI bleeding, talk with your doctor before using these medicines.)  · Throat lozenges or numbing throat sprays can help reduce pain. Gargling with warm salt water will also help reduce throat pain. For this, dissolve 1/2 teaspoon of salt in 1 glass of warm water. To help soothe a sore throat, children can sip on juice or a popsicle. Children 5 years and older can also suck on a lollipop or hard candy.  · Avoid salty or spicy foods, which can be irritating to the throat.  Follow-up care  Follow up with your healthcare provider or our staff if you are not improving over the next week.  When to seek medical advice  Call your healthcare provider right away if any of these occur:  · Fever as directed by your doctor.  For children, seek care if:  ¨ Your child is of any age and has repeated fevers above 104°F (40°C).  ¨ Your child is younger than 2 years of age and has a  fever of 100.4°F (38°C) that continues for more than 1 day.  ¨ Your child is 2 years old or older and has a fever of 100.4°F (38°C) that continues for more than 3 days.  · New or worsening ear pain, sinus pain, or headache  · Painful lumps in the back of neck  · Stiff neck  · Lymph nodes are getting larger  · Inability to swallow liquids, excessive drooling, or inability to open mouth wide due to throat pain  · Signs of dehydration (very dark urine or no urine, sunken eyes, dizziness)  · Trouble breathing or noisy breathing  · Muffled voice  · New rash  · Child appears to be getting sicker  Date Last Reviewed: 4/13/2015  © 2983-4921 [email or web address]. [address] [place] [address], [place] [address]. All rights reserved. This information is not intended as a substitute for professional medical care. Always follow your healthcare professional's instructions.

## 2020-01-26 ENCOUNTER — TELEPHONE (OUTPATIENT)
Dept: URGENT CARE | Facility: CLINIC | Age: 30
End: 2020-01-26

## 2020-01-26 DIAGNOSIS — J02.0 STREP THROAT: Primary | ICD-10-CM

## 2020-01-26 LAB — BACTERIA THROAT CULT: ABNORMAL

## 2020-01-26 RX ORDER — AZITHROMYCIN 250 MG/1
TABLET, FILM COATED ORAL
Qty: 6 TABLET | Refills: 0 | Status: SHIPPED | OUTPATIENT
Start: 2020-01-26 | End: 2020-01-31

## 2020-01-26 RX ORDER — AZITHROMYCIN 250 MG/1
TABLET, FILM COATED ORAL
Qty: 6 TABLET | Refills: 0 | Status: SHIPPED | OUTPATIENT
Start: 2020-01-26 | End: 2020-01-26

## 2020-01-26 NOTE — TELEPHONE ENCOUNTER
Spoke with patient. Strep culture came back positive.   She has an allergy to penicillins. Vanda called into pharmacy. She is also requesting something for pain. Called in magic mouthwash. Advised swish and spit, do not swallow, be careful not to injure mouth.    She is breastfeeding. I recommend she call pediatrician for advice on if she should discontinue breastfeeding until finished with antibiotics. She should d/c until then.    All questions answered

## 2020-01-28 RX ORDER — FLUCONAZOLE 150 MG/1
150 TABLET ORAL DAILY
Qty: 3 TABLET | Refills: 0 | Status: SHIPPED | OUTPATIENT
Start: 2020-01-28 | End: 2020-02-09

## 2020-02-03 ENCOUNTER — OFFICE VISIT (OUTPATIENT)
Dept: OPTOMETRY | Facility: CLINIC | Age: 30
End: 2020-02-03
Payer: COMMERCIAL

## 2020-02-03 DIAGNOSIS — H16.143 SUPERFICIAL PUNCTATE KERATITIS OF BOTH EYES: ICD-10-CM

## 2020-02-03 DIAGNOSIS — H01.001 BLEPHARITIS OF UPPER EYELIDS OF BOTH EYES, UNSPECIFIED TYPE: ICD-10-CM

## 2020-02-03 DIAGNOSIS — H52.203 MYOPIA OF BOTH EYES WITH ASTIGMATISM: Primary | ICD-10-CM

## 2020-02-03 DIAGNOSIS — H01.004 BLEPHARITIS OF UPPER EYELIDS OF BOTH EYES, UNSPECIFIED TYPE: ICD-10-CM

## 2020-02-03 DIAGNOSIS — H52.13 MYOPIA OF BOTH EYES WITH ASTIGMATISM: Primary | ICD-10-CM

## 2020-02-03 PROCEDURE — 92015 DETERMINE REFRACTIVE STATE: CPT | Mod: S$GLB,,, | Performed by: OPTOMETRIST

## 2020-02-03 PROCEDURE — 99999 PR PBB SHADOW E&M-EST. PATIENT-LVL II: CPT | Mod: PBBFAC,,, | Performed by: OPTOMETRIST

## 2020-02-03 PROCEDURE — 99999 PR PBB SHADOW E&M-EST. PATIENT-LVL II: ICD-10-PCS | Mod: PBBFAC,,, | Performed by: OPTOMETRIST

## 2020-02-03 PROCEDURE — 92015 PR REFRACTION: ICD-10-PCS | Mod: S$GLB,,, | Performed by: OPTOMETRIST

## 2020-02-03 PROCEDURE — 92004 PR EYE EXAM, NEW PATIENT,COMPREHESV: ICD-10-PCS | Mod: S$GLB,,, | Performed by: OPTOMETRIST

## 2020-02-03 PROCEDURE — 92004 COMPRE OPH EXAM NEW PT 1/>: CPT | Mod: S$GLB,,, | Performed by: OPTOMETRIST

## 2020-02-03 NOTE — PROGRESS NOTES
HPI     30 y/o female is here for a complete eye exam  Needs new glasses. Here vision changed after having here 1st child and   again with the 2nd child. She hasn't worn glasses in 2 years.   problems with night driving  Occasional floaters no flashes  No itching burning or tearing  No contact lens wear  Last eye exam 2016    (-)POHx  (+)FOHx, DM Ret -- grandmother     Last edited by Tammy Tristan, OD on 2/3/2020 11:16 AM. (History)            Assessment /Plan     For exam results, see Encounter Report.    Myopia of both eyes with astigmatism    Superficial punctate keratitis of both eyes      1. Decreased VA OD>OS. Will hold refraction until cornea f/u. Refraction will be performed again at the f/u. Monitor 1-2 weeks.   Higher Ks. If VA still decreased at f/u, consider a tito.     2-3. SPK OD>OS. ABDIAZIZ OU. Recommend PF ATs 5x a day + roger QHS + ocusoft lid scrubs along eyelid margin Qday. Monitor 1-2 weeks.       RTC in 1-2 weeks for cornea f/u or sooner if needed.

## 2020-03-13 ENCOUNTER — PATIENT MESSAGE (OUTPATIENT)
Dept: INTERNAL MEDICINE | Facility: CLINIC | Age: 30
End: 2020-03-13

## 2020-03-13 DIAGNOSIS — F41.9 ANXIETY AND DEPRESSION: ICD-10-CM

## 2020-03-13 DIAGNOSIS — F32.A ANXIETY AND DEPRESSION: ICD-10-CM

## 2020-03-13 RX ORDER — ESCITALOPRAM OXALATE 20 MG/1
20 TABLET ORAL DAILY
Qty: 90 TABLET | Refills: 3 | Status: SHIPPED | OUTPATIENT
Start: 2020-03-13 | End: 2021-03-08

## 2020-03-25 ENCOUNTER — PATIENT MESSAGE (OUTPATIENT)
Dept: INTERNAL MEDICINE | Facility: CLINIC | Age: 30
End: 2020-03-25

## 2020-03-25 ENCOUNTER — OFFICE VISIT (OUTPATIENT)
Dept: INTERNAL MEDICINE | Facility: CLINIC | Age: 30
End: 2020-03-25
Payer: COMMERCIAL

## 2020-03-25 VITALS
OXYGEN SATURATION: 99 % | BODY MASS INDEX: 28.31 KG/M2 | TEMPERATURE: 99 F | HEART RATE: 73 BPM | SYSTOLIC BLOOD PRESSURE: 102 MMHG | DIASTOLIC BLOOD PRESSURE: 72 MMHG | WEIGHT: 149.94 LBS | HEIGHT: 61 IN

## 2020-03-25 DIAGNOSIS — E66.3 OVERWEIGHT (BMI 25.0-29.9): ICD-10-CM

## 2020-03-25 DIAGNOSIS — J01.40 ACUTE NON-RECURRENT PANSINUSITIS: ICD-10-CM

## 2020-03-25 DIAGNOSIS — R09.82 POSTNASAL DRIP: ICD-10-CM

## 2020-03-25 DIAGNOSIS — H60.392 OTHER INFECTIVE ACUTE OTITIS EXTERNA OF LEFT EAR: Primary | ICD-10-CM

## 2020-03-25 PROCEDURE — 99214 PR OFFICE/OUTPT VISIT, EST, LEVL IV, 30-39 MIN: ICD-10-PCS | Mod: S$GLB,,, | Performed by: NURSE PRACTITIONER

## 2020-03-25 PROCEDURE — 99214 OFFICE O/P EST MOD 30 MIN: CPT | Mod: S$GLB,,, | Performed by: NURSE PRACTITIONER

## 2020-03-25 PROCEDURE — 3008F BODY MASS INDEX DOCD: CPT | Mod: CPTII,S$GLB,, | Performed by: NURSE PRACTITIONER

## 2020-03-25 PROCEDURE — 99999 PR PBB SHADOW E&M-EST. PATIENT-LVL IV: ICD-10-PCS | Mod: PBBFAC,,, | Performed by: NURSE PRACTITIONER

## 2020-03-25 PROCEDURE — 3008F PR BODY MASS INDEX (BMI) DOCUMENTED: ICD-10-PCS | Mod: CPTII,S$GLB,, | Performed by: NURSE PRACTITIONER

## 2020-03-25 PROCEDURE — 99999 PR PBB SHADOW E&M-EST. PATIENT-LVL IV: CPT | Mod: PBBFAC,,, | Performed by: NURSE PRACTITIONER

## 2020-03-25 RX ORDER — CIPROFLOXACIN AND DEXAMETHASONE 3; 1 MG/ML; MG/ML
4 SUSPENSION/ DROPS AURICULAR (OTIC) 2 TIMES DAILY
Qty: 7.5 ML | Refills: 0 | Status: SHIPPED | OUTPATIENT
Start: 2020-03-25 | End: 2020-04-12

## 2020-03-25 RX ORDER — FLUTICASONE PROPIONATE 50 MCG
1 SPRAY, SUSPENSION (ML) NASAL 2 TIMES DAILY
Qty: 16 G | Refills: 0 | Status: SHIPPED | OUTPATIENT
Start: 2020-03-25

## 2020-03-25 RX ORDER — LEVOCETIRIZINE DIHYDROCHLORIDE 5 MG/1
5 TABLET, FILM COATED ORAL NIGHTLY
Qty: 30 TABLET | Refills: 0 | Status: SHIPPED | OUTPATIENT
Start: 2020-03-25 | End: 2021-03-25

## 2020-03-25 NOTE — PROGRESS NOTES
"Subjective:       Patient ID: German Hancock is a 29 y.o. female.    Chief Complaint: Otalgia (ear pain- 3 days) and Sore Throat    Pt of Dr. Buenrostro, here for earache. Messaged PCP yesterday stating, "I am having a ear ache and I think i have post nasal drip. My throat is jannette irritated because of it and the last time I had symptoms like this it turned out to be strep throat and sinus infection. Can I get an appt to see you or get something for it? I have no fever or cough just left ear ache and pressure and slightly irritated throat." Was told she needed an in clinic visit and since Dr. Buenrostro is only doing telemedicine, she was scheduled with me.      Otalgia    There is pain in the left ear. This is a new problem. The current episode started in the past 7 days (3-4 days). The problem occurs constantly. The problem has been waxing and waning. There has been no fever. The pain is at a severity of 6/10. The pain is moderate. Associated symptoms include neck pain and a sore throat. Pertinent negatives include no abdominal pain, coughing, diarrhea, ear discharge, headaches, hearing loss, rash, rhinorrhea or vomiting. Treatments tried: claritin, benadryl 3 days ago. The treatment provided no relief. There is no history of a chronic ear infection, hearing loss or a tympanostomy tube.     Review of Systems   Constitutional: Negative for chills, fatigue and fever.   HENT: Positive for congestion, ear pain, postnasal drip, sinus pressure and sore throat. Negative for ear discharge, hearing loss, rhinorrhea, sinus pain, sneezing, tinnitus and voice change.    Eyes: Negative for pain.   Respiratory: Negative for cough, chest tightness and shortness of breath.    Cardiovascular: Negative for chest pain.   Gastrointestinal: Negative for abdominal pain, diarrhea, nausea and vomiting.   Musculoskeletal: Positive for neck pain. Negative for arthralgias.   Skin: Negative for rash.   Allergic/Immunologic: Negative for environmental " allergies, food allergies and immunocompromised state.   Neurological: Negative for dizziness, weakness, light-headedness, numbness and headaches.   Hematological: Negative for adenopathy. Does not bruise/bleed easily.   Psychiatric/Behavioral: Negative for suicidal ideas.       Review of patient's allergies indicates:   Allergen Reactions    Penicillins      Rash.     Current Outpatient Medications:     cholecalciferol, vitamin D3, (VITAMIN D3) 1,000 unit capsule, Take 1 capsule (1,000 Units total) by mouth once daily., Disp: , Rfl: 0    escitalopram oxalate (LEXAPRO) 20 MG tablet, Take 1 tablet (20 mg total) by mouth once daily., Disp: 90 tablet, Rfl: 3    loratadine (CLARITIN) 10 mg tablet, Take 1 tablet (10 mg total) by mouth once daily., Disp: 30 tablet, Rfl: 0    traZODone (DESYREL) 50 MG tablet, Take 0.5 tablets (25 mg total) by mouth nightly as needed for Insomnia., Disp: 30 tablet, Rfl: 3    traZODone (DESYREL) 50 MG tablet, Take 1 tablet (50 mg total) by mouth nightly as needed for Insomnia., Disp: 30 tablet, Rfl: 3    Patient Active Problem List   Diagnosis    Anxiety and depression    Insomnia     Past Medical History:   Diagnosis Date    Anxiety     Depression     Vitamin D deficiency      No past surgical history on file.    Social History     Socioeconomic History    Marital status:      Spouse name: Not on file    Number of children: Not on file    Years of education: Not on file    Highest education level: Not on file   Occupational History    Not on file   Social Needs    Financial resource strain: Not hard at all    Food insecurity:     Worry: Never true     Inability: Never true    Transportation needs:     Medical: No     Non-medical: No   Tobacco Use    Smoking status: Never Smoker    Smokeless tobacco: Never Used   Substance and Sexual Activity    Alcohol use: Yes     Frequency: Monthly or less     Drinks per session: 1 or 2     Binge frequency: Never    Drug use:  "Never    Sexual activity: Not Currently   Lifestyle    Physical activity:     Days per week: 3 days     Minutes per session: 30 min    Stress: Very much   Relationships    Social connections:     Talks on phone: Never     Gets together: Never     Attends Religion service: Not on file     Active member of club or organization: No     Attends meetings of clubs or organizations: Never     Relationship status:    Other Topics Concern    Not on file   Social History Narrative    Not on file     Family History   Problem Relation Age of Onset    Colon cancer Neg Hx     Ovarian cancer Neg Hx     Breast cancer Neg Hx     Glaucoma Neg Hx     Retinal detachment Neg Hx     Macular degeneration Neg Hx        Objective:       Vitals:    03/25/20 1124   BP: 102/72   Pulse: 73   Temp: 98.5 °F (36.9 °C)   TempSrc: Oral   SpO2: 99%   Weight: 68 kg (149 lb 14.6 oz)   Height: 5' 1" (1.549 m)   PainSc:   6   PainLoc: Ear       Body mass index is 28.33 kg/m².    Physical Exam   Constitutional: She is oriented to person, place, and time. She appears well-developed and well-nourished.   overweight   HENT:   Head: Normocephalic.   Right Ear: Tympanic membrane, external ear and ear canal normal.   Left Ear: Tympanic membrane normal. There is drainage, swelling and tenderness.   Nose: Nose normal.   Mouth/Throat: Oropharynx is clear and moist. No oropharyngeal exudate.   Eyes: Pupils are equal, round, and reactive to light. Conjunctivae and EOM are normal.   Neck: Normal range of motion. Neck supple.   Pulmonary/Chest: Effort normal and breath sounds normal.   Musculoskeletal: Normal range of motion.   Neurological: She is alert and oriented to person, place, and time.   Skin: Skin is warm and dry. Capillary refill takes less than 2 seconds.   Psychiatric: She has a normal mood and affect. Her behavior is normal. Judgment and thought content normal.   Nursing note and vitals reviewed.      Assessment:       1. Other " infective acute otitis externa of left ear    2. Acute non-recurrent pansinusitis    3. Postnasal drip    4. BMI 28.0-28.9,adult    5. Overweight (BMI 25.0-29.9)        Plan:       German was seen today for otalgia and sore throat.    Diagnoses and all orders for this visit:    Other infective acute otitis externa of left ear  -     ciprofloxacin-dexamethasone 0.3-0.1% (CIPRODEX) 0.3-0.1 % DrpS; Place 4 drops into both ears 2 (two) times daily. for 7 days    Acute non-recurrent pansinusitis  -     fluticasone propionate (FLONASE) 50 mcg/actuation nasal spray; 1 spray (50 mcg total) by Each Nostril route 2 (two) times daily.  -     levocetirizine (XYZAL) 5 MG tablet; Take 1 tablet (5 mg total) by mouth every evening.    Postnasal drip  -     fluticasone propionate (FLONASE) 50 mcg/actuation nasal spray; 1 spray (50 mcg total) by Each Nostril route 2 (two) times daily.  -     levocetirizine (XYZAL) 5 MG tablet; Take 1 tablet (5 mg total) by mouth every evening.    BMI 28.0-28.9,adult  BMI reviewed    Overweight (BMI 25.0-29.9)  BMI reviewed.    Diet and exercise to lose weight.    Avoid excess water in ear    Ear drops as prescribed    Avoid sticking anything in ears    Meds as prescribed    Rest and Fluids, Tylenol or Motrin otc as needed for pain and or fever    Warm liquids to thin mucus    Allergen avoidance discussed, humidified air recommended    Warm salt water gargles as needed for throat pain    Nasal spray, taught how to correctly use    Self care instructions provided in AVS    Follow up if symptoms worsen or fail to improve.

## 2020-03-25 NOTE — PATIENT INSTRUCTIONS
Avoid excess water in ear    Ear drops as prescribed    Avoid sticking anything in ears    Meds as prescribed    Rest and Fluids, Tylenol or Motrin otc as needed for pain and or fever    Warm liquids to thin mucus    Allergen avoidance discussed, humidified air recommended    Warm salt water gargles as needed for throat pain    Nasal spray, taught how to correctly use      External Ear Infection (Adult)    External otitis (also called swimmers ear) is an infection in the ear canal. It is often caused by bacteria or fungus. It can occur a few days after water gets trapped in the ear canal (from swimming or bathing). It can also occur after cleaning too deeply in the ear canal with a cotton swab or other object. Sometimes, hair care products get into the ear canal and cause this problem.  Symptoms can include pain, fever, itching, redness, drainage, or swelling of the ear canal. Temporary hearing loss may also occur.  Home care  · Do not try to clean the ear canal. This can push pus and bacteria deeper into the canal.  · Use prescribed ear drops as directed. These help reduce swelling and fight the infection. If an ear wick was placed in the ear canal, apply drops right onto the end of the wick. The wick will draw the medication into the ear canal even if it is swollen closed.  · A cotton ball may be loosely placed in the outer ear to absorb any drainage.  · You may use acetaminophen or ibuprofen to control pain, unless another medication was prescribed. Note: If you have chronic liver or kidney disease or ever had a stomach ulcer or GI bleeding, talk to your health care provider before taking any of these medications.  · Do not allow water to get into your ear when bathing. Also, avoid swimming until the infection has cleared.  Prevention  · Keep your ears dry. This helps lower the risk of infection. Dry your ears with a towel or hair dryer after getting wet. Also, use ear plugs when swimming.  · Do not stick any  objects in the ear to remove wax.  · If you feel water trapped in your ear, use ear drops right away. You can get these drops over the counter at most drugstores. They work by removing water from the ear canal.  Follow-up care  Follow up with your health care provider in one week, or as advised.  When to seek medical advice  Call your health care provider right away if any of these occur:  · Ear pain becomes worse or doesnt improve after 3 days of treatment  · Redness or swelling of the outer ear occurs or gets worse  · Headache  · Painful or stiff neck  · Drowsiness or confusion  · Fever of 100.4ºF (38ºC) or higher, or as directed by your health care provider  · Seizure  Date Last Reviewed: 3/22/2015  © 4514-1712 [employer]. [address] [place] [address]. All rights reserved. This information is not intended as a substitute for professional medical care. Always follow your healthcare professional's instructions.      Sinusitis (No Antibiotics)    The sinuses are air-filled spaces within the bones of the face. They connect to the inside of the nose. Sinusitis is an inflammation of the tissue lining the sinus cavity. Sinus inflammation can occur during a cold. It can also be due to allergies to pollens and other particles in the air. It can cause symptoms such as sinus congestion, headache, sore throat, facial swelling and fullness. It may also cause a low-grade fever. No infection is present, and no antibiotic treatment is needed.  Home care  · Drink plenty of water, hot tea, and other liquids. This may help thin mucus. It also may promote sinus drainage.  · Heat may help soothe painful areas of the face. Use a towel soaked in hot water. Or,  the shower and direct the hot spray onto your face. Using a vaporizer along with a menthol rub at night may also help.   · An expectorant containing guaifenesin may help thin the mucus and promote drainage from the  sinuses.  · Over-the-counter decongestants may be used unless a similar medicine was prescribed. Nasal sprays work the fastest. Use one that contains phenylephrine or oxymetazoline. First blow the nose gently. Then use the spray. Do not use these medicines more often than directed on the label or symptoms may get worse. You may also use tablets containing pseudoephedrine. Avoid products that combine ingredients, because side effects may be increased. Read labels. You can also ask the pharmacist for help. (NOTE: Persons with high blood pressure should not use decongestants. They can raise blood pressure.)  · Over-the-counter antihistamines may help if allergies contributed to your sinusitis.    · Use acetaminophen or ibuprofen to control pain, unless another pain medicine was prescribed. (If you have chronic liver or kidney disease or ever had a stomach ulcer, talk with your doctor before using these medicines. Aspirin should never be used in anyone under 18 years of age who is ill with a fever. It may cause severe liver damage.)  · Use nasal rinses or irrigation as instructed by your health care provider.  · Don't smoke. This can worsen symptoms.  Follow-up care  Follow up with your healthcare provider or our staff if you are not improving within the next week.  When to seek medical advice  Call your healthcare provider if any of these occur:  · Green or yellow discharge from the nose or into the throat  · Facial pain or headache becoming more severe  · Stiff neck  · Unusual drowsiness or confusion  · Swelling of the forehead or eyelids  · Vision problems, including blurred or double vision  · Fever of 100.4ºF (38ºC) or higher, or as directed by your healthcare provider  · Seizure  · Breathing problems  · Symptoms not resolving within 10 days  Date Last Reviewed: 4/13/2015  © 4699-2441 [employer]. [address] [place] [address], [place] [address]. All rights reserved. This information is not intended as a  substitute for professional medical care. Always follow your healthcare professional's instructions.

## 2020-03-25 NOTE — TELEPHONE ENCOUNTER
Can you set her up for an in person urgent visit for sore throat and earache? I am on telemedicine only currently. Thanks!

## 2020-03-27 ENCOUNTER — PATIENT MESSAGE (OUTPATIENT)
Dept: INTERNAL MEDICINE | Facility: CLINIC | Age: 30
End: 2020-03-27

## 2020-04-21 DIAGNOSIS — Z01.84 ANTIBODY RESPONSE EXAMINATION: ICD-10-CM

## 2020-04-29 ENCOUNTER — LAB VISIT (OUTPATIENT)
Dept: INTERNAL MEDICINE | Facility: CLINIC | Age: 30
End: 2020-04-29
Payer: COMMERCIAL

## 2020-04-29 ENCOUNTER — OFFICE VISIT (OUTPATIENT)
Dept: INTERNAL MEDICINE | Facility: CLINIC | Age: 30
End: 2020-04-29
Payer: COMMERCIAL

## 2020-04-29 DIAGNOSIS — R19.7 DIARRHEA, UNSPECIFIED TYPE: Primary | ICD-10-CM

## 2020-04-29 DIAGNOSIS — A09 DIARRHEA OF INFECTIOUS ORIGIN: ICD-10-CM

## 2020-04-29 DIAGNOSIS — R19.7 DIARRHEA, UNSPECIFIED TYPE: ICD-10-CM

## 2020-04-29 LAB — SARS-COV-2 RNA RESP QL NAA+PROBE: NOT DETECTED

## 2020-04-29 PROCEDURE — 99213 OFFICE O/P EST LOW 20 MIN: CPT | Mod: 95,,, | Performed by: INTERNAL MEDICINE

## 2020-04-29 PROCEDURE — 99213 PR OFFICE/OUTPT VISIT, EST, LEVL III, 20-29 MIN: ICD-10-PCS | Mod: 95,,, | Performed by: INTERNAL MEDICINE

## 2020-04-29 PROCEDURE — U0002 COVID-19 LAB TEST NON-CDC: HCPCS

## 2020-04-29 NOTE — PROGRESS NOTES
Subjective:       Patient ID: German Hancock is a 29 y.o. female.    Chief Complaint: diarrhea    HPI   German Hancock is a 29 y.o. female presenting via video visit for evaluation/follow up of the following issues. This visit occurs during the COVID 19 outbreak.     The patient location is: patient's home  The chief complaint leading to consultation is: diarrhea  Visit type: Virtual visit with synchronous audio and video  Total time spent with patient: 9 minutes  Each patient to whom he or she provides medical services by telemedicine is:  (1) informed of the relationship between the physician and patient and the respective role of any other health care provider with respect to management of the patient; and (2) notified that he or she may decline to receive medical services by telemedicine and may withdraw from such care at any time.    Yesterday evening started having sharp pains and had to go to bathroom. Didn't eat much - toast and tea. woek up with sharp pains and diarrhea. Epigastric and some in lower abdomen as well. No fever. Some mild scratchy throat. No nausea nor vomiting. No direct contact with c diff nor covid 19 to her knowledge. She had fried chicken yesterday- first time eating greasy food in awhile. Other person who ate the same didn't have it. From [employer]. Other than that preparing her own meals.     She has been taking a liquid collagen supplement for about 2 weeks.       Review of Systems   Constitutional: Negative for fever.   Respiratory: Negative for shortness of breath.    Cardiovascular: Negative for chest pain.   Gastrointestinal: Positive for abdominal pain and diarrhea.   Musculoskeletal: Negative.    Skin: Negative.        Objective:   There were no vitals taken for this visit.     Physical Exam   Constitutional: She is oriented to person, place, and time. She appears well-developed and well-nourished. No distress.   HENT:   Head: Atraumatic.   Pulmonary/Chest: Effort normal. No respiratory  distress.   Neurological: She is alert and oriented to person, place, and time.   Skin: She is not diaphoretic.   Psychiatric: She has a normal mood and affect. Her behavior is normal.         No significant discomfort on self palpation of the abdomen.   Assessment:       1. Diarrhea, unspecified type    2. Diarrhea of infectious origin        Plan:       Diagnoses and all orders for this visit:    Diarrhea of presumed infectious origin  -     COVID-19 Routine Screening; Future   [place] diet, hydration recommendations reviewed   To stay home from work and home quarantine until covid 19 results reviewed and diarrhea resolved.    If covid 19 negative suspect viral gastroenteritis vs food borne(recent gas station fried chicken could be culprit)

## 2020-04-30 ENCOUNTER — PATIENT MESSAGE (OUTPATIENT)
Dept: INTERNAL MEDICINE | Facility: CLINIC | Age: 30
End: 2020-04-30

## 2020-05-21 DIAGNOSIS — Z01.84 ANTIBODY RESPONSE EXAMINATION: ICD-10-CM

## 2020-05-26 RX ORDER — SULFAMETHOXAZOLE AND TRIMETHOPRIM 800; 160 MG/1; MG/1
1 TABLET ORAL 2 TIMES DAILY
Qty: 20 TABLET | Refills: 0 | Status: SHIPPED | OUTPATIENT
Start: 2020-05-26 | End: 2020-06-05

## 2020-06-20 DIAGNOSIS — Z01.84 ANTIBODY RESPONSE EXAMINATION: ICD-10-CM

## 2020-07-20 DIAGNOSIS — Z01.84 ANTIBODY RESPONSE EXAMINATION: ICD-10-CM

## 2020-08-19 DIAGNOSIS — Z01.84 ANTIBODY RESPONSE EXAMINATION: ICD-10-CM

## 2020-09-18 DIAGNOSIS — Z01.84 ANTIBODY RESPONSE EXAMINATION: ICD-10-CM

## 2020-10-18 DIAGNOSIS — Z01.84 ANTIBODY RESPONSE EXAMINATION: ICD-10-CM

## 2020-11-17 DIAGNOSIS — Z01.84 ANTIBODY RESPONSE EXAMINATION: ICD-10-CM

## 2021-04-28 ENCOUNTER — PATIENT MESSAGE (OUTPATIENT)
Dept: RESEARCH | Facility: HOSPITAL | Age: 31
End: 2021-04-28

## 2021-07-06 ENCOUNTER — PATIENT MESSAGE (OUTPATIENT)
Dept: ADMINISTRATIVE | Facility: HOSPITAL | Age: 31
End: 2021-07-06

## 2021-10-04 ENCOUNTER — PATIENT MESSAGE (OUTPATIENT)
Dept: ADMINISTRATIVE | Facility: HOSPITAL | Age: 31
End: 2021-10-04

## 2022-03-16 ENCOUNTER — PATIENT MESSAGE (OUTPATIENT)
Dept: ADMINISTRATIVE | Facility: HOSPITAL | Age: 32
End: 2022-03-16
Payer: COMMERCIAL

## 2023-03-23 ENCOUNTER — PATIENT OUTREACH (OUTPATIENT)
Dept: ADMINISTRATIVE | Facility: HOSPITAL | Age: 33
End: 2023-03-23
Payer: COMMERCIAL

## 2023-03-23 NOTE — PROGRESS NOTES
Health Maintenance Due   Topic Date Due    COVID-19 Vaccine (1) Never done    Influenza Vaccine (1) 09/01/2022       HM updated. Triggered LINKS and Care Everywhere. Chart reviewed.     Fela Tse LPN   Clinical Care Coordinator  [facility]

## 2023-03-31 ENCOUNTER — PATIENT OUTREACH (OUTPATIENT)
Dept: ADMINISTRATIVE | Facility: HOSPITAL | Age: 33
End: 2023-03-31
Payer: COMMERCIAL

## 2023-03-31 NOTE — PROGRESS NOTES
Health Maintenance Due   Topic Date Due    COVID-19 Vaccine (4 - Booster for Pfizer series) 02/24/2022     HM updated. Triggered LINKS and Care  everywhere. Chart reviewed.     Fela Tse LPN   Clinical Care Coordinator  [facility]